# Patient Record
Sex: FEMALE | Race: BLACK OR AFRICAN AMERICAN | NOT HISPANIC OR LATINO | URBAN - METROPOLITAN AREA
[De-identification: names, ages, dates, MRNs, and addresses within clinical notes are randomized per-mention and may not be internally consistent; named-entity substitution may affect disease eponyms.]

---

## 2024-01-28 ENCOUNTER — EMERGENCY (EMERGENCY)
Facility: HOSPITAL | Age: 64
LOS: 1 days | Discharge: HOME | End: 2024-01-28
Admitting: EMERGENCY MEDICINE
Payer: COMMERCIAL

## 2024-01-28 VITALS
TEMPERATURE: 100 F | HEART RATE: 86 BPM | DIASTOLIC BLOOD PRESSURE: 78 MMHG | OXYGEN SATURATION: 99 % | RESPIRATION RATE: 20 BRPM | SYSTOLIC BLOOD PRESSURE: 157 MMHG

## 2024-01-28 VITALS
RESPIRATION RATE: 18 BRPM | SYSTOLIC BLOOD PRESSURE: 153 MMHG | OXYGEN SATURATION: 100 % | TEMPERATURE: 99 F | DIASTOLIC BLOOD PRESSURE: 74 MMHG | HEART RATE: 66 BPM

## 2024-01-28 LAB
ALBUMIN SERPL ELPH-MCNC: 3.8 G/DL — SIGNIFICANT CHANGE UP (ref 3.3–5)
ALP SERPL-CCNC: 72 U/L — SIGNIFICANT CHANGE UP (ref 40–120)
ALT FLD-CCNC: 12 U/L — SIGNIFICANT CHANGE UP (ref 4–33)
ANION GAP SERPL CALC-SCNC: 16 MMOL/L — HIGH (ref 7–14)
APPEARANCE UR: CLEAR — SIGNIFICANT CHANGE UP
AST SERPL-CCNC: 20 U/L — SIGNIFICANT CHANGE UP (ref 4–32)
BACTERIA # UR AUTO: NEGATIVE /HPF — SIGNIFICANT CHANGE UP
BASE EXCESS BLDV CALC-SCNC: 4.5 MMOL/L — HIGH (ref -2–3)
BASOPHILS # BLD AUTO: 0.02 K/UL — SIGNIFICANT CHANGE UP (ref 0–0.2)
BASOPHILS NFR BLD AUTO: 0.3 % — SIGNIFICANT CHANGE UP (ref 0–2)
BILIRUB SERPL-MCNC: 0.4 MG/DL — SIGNIFICANT CHANGE UP (ref 0.2–1.2)
BILIRUB UR-MCNC: NEGATIVE — SIGNIFICANT CHANGE UP
BLOOD GAS VENOUS COMPREHENSIVE RESULT: SIGNIFICANT CHANGE UP
BUN SERPL-MCNC: 15 MG/DL — SIGNIFICANT CHANGE UP (ref 7–23)
CALCIUM SERPL-MCNC: 9.7 MG/DL — SIGNIFICANT CHANGE UP (ref 8.4–10.5)
CAST: 1 /LPF — SIGNIFICANT CHANGE UP (ref 0–4)
CHLORIDE BLDV-SCNC: 103 MMOL/L — SIGNIFICANT CHANGE UP (ref 96–108)
CHLORIDE SERPL-SCNC: 104 MMOL/L — SIGNIFICANT CHANGE UP (ref 98–107)
CO2 BLDV-SCNC: 31.2 MMOL/L — HIGH (ref 22–26)
CO2 SERPL-SCNC: 24 MMOL/L — SIGNIFICANT CHANGE UP (ref 22–31)
COLOR SPEC: YELLOW — SIGNIFICANT CHANGE UP
CREAT SERPL-MCNC: 0.75 MG/DL — SIGNIFICANT CHANGE UP (ref 0.5–1.3)
CRP SERPL-MCNC: 8.8 MG/L — HIGH
DIFF PNL FLD: ABNORMAL
EGFR: 90 ML/MIN/1.73M2 — SIGNIFICANT CHANGE UP
EOSINOPHIL # BLD AUTO: 0.03 K/UL — SIGNIFICANT CHANGE UP (ref 0–0.5)
EOSINOPHIL NFR BLD AUTO: 0.5 % — SIGNIFICANT CHANGE UP (ref 0–6)
ERYTHROCYTE [SEDIMENTATION RATE] IN BLOOD: 75 MM/HR — HIGH (ref 4–25)
GAS PNL BLDV: 136 MMOL/L — SIGNIFICANT CHANGE UP (ref 136–145)
GLUCOSE BLDV-MCNC: 93 MG/DL — SIGNIFICANT CHANGE UP (ref 70–99)
GLUCOSE SERPL-MCNC: 93 MG/DL — SIGNIFICANT CHANGE UP (ref 70–99)
GLUCOSE UR QL: NEGATIVE MG/DL — SIGNIFICANT CHANGE UP
HCO3 BLDV-SCNC: 30 MMOL/L — HIGH (ref 22–29)
HCT VFR BLD CALC: 41.3 % — SIGNIFICANT CHANGE UP (ref 34.5–45)
HCT VFR BLDA CALC: 42 % — SIGNIFICANT CHANGE UP (ref 34.5–46.5)
HGB BLD CALC-MCNC: 13.9 G/DL — SIGNIFICANT CHANGE UP (ref 11.7–16.1)
HGB BLD-MCNC: 13.4 G/DL — SIGNIFICANT CHANGE UP (ref 11.5–15.5)
IANC: 3.99 K/UL — SIGNIFICANT CHANGE UP (ref 1.8–7.4)
IMM GRANULOCYTES NFR BLD AUTO: 0.2 % — SIGNIFICANT CHANGE UP (ref 0–0.9)
KETONES UR-MCNC: NEGATIVE MG/DL — SIGNIFICANT CHANGE UP
LACTATE BLDV-MCNC: 1.3 MMOL/L — SIGNIFICANT CHANGE UP (ref 0.5–2)
LEUKOCYTE ESTERASE UR-ACNC: NEGATIVE — SIGNIFICANT CHANGE UP
LIDOCAIN IGE QN: 31 U/L — SIGNIFICANT CHANGE UP (ref 7–60)
LYMPHOCYTES # BLD AUTO: 1.93 K/UL — SIGNIFICANT CHANGE UP (ref 1–3.3)
LYMPHOCYTES # BLD AUTO: 30.3 % — SIGNIFICANT CHANGE UP (ref 13–44)
MCHC RBC-ENTMCNC: 29.6 PG — SIGNIFICANT CHANGE UP (ref 27–34)
MCHC RBC-ENTMCNC: 32.4 GM/DL — SIGNIFICANT CHANGE UP (ref 32–36)
MCV RBC AUTO: 91.2 FL — SIGNIFICANT CHANGE UP (ref 80–100)
MONOCYTES # BLD AUTO: 0.39 K/UL — SIGNIFICANT CHANGE UP (ref 0–0.9)
MONOCYTES NFR BLD AUTO: 6.1 % — SIGNIFICANT CHANGE UP (ref 2–14)
NEUTROPHILS # BLD AUTO: 3.99 K/UL — SIGNIFICANT CHANGE UP (ref 1.8–7.4)
NEUTROPHILS NFR BLD AUTO: 62.6 % — SIGNIFICANT CHANGE UP (ref 43–77)
NITRITE UR-MCNC: NEGATIVE — SIGNIFICANT CHANGE UP
NRBC # BLD: 0 /100 WBCS — SIGNIFICANT CHANGE UP (ref 0–0)
NRBC # FLD: 0 K/UL — SIGNIFICANT CHANGE UP (ref 0–0)
PCO2 BLDV: 46 MMHG — SIGNIFICANT CHANGE UP (ref 39–52)
PH BLDV: 7.42 — SIGNIFICANT CHANGE UP (ref 7.32–7.43)
PH UR: 5.5 — SIGNIFICANT CHANGE UP (ref 5–8)
PLATELET # BLD AUTO: 244 K/UL — SIGNIFICANT CHANGE UP (ref 150–400)
PO2 BLDV: 50 MMHG — HIGH (ref 25–45)
POTASSIUM BLDV-SCNC: 3.7 MMOL/L — SIGNIFICANT CHANGE UP (ref 3.5–5.1)
POTASSIUM SERPL-MCNC: 3.9 MMOL/L — SIGNIFICANT CHANGE UP (ref 3.5–5.3)
POTASSIUM SERPL-SCNC: 3.9 MMOL/L — SIGNIFICANT CHANGE UP (ref 3.5–5.3)
PROT SERPL-MCNC: 8.8 G/DL — HIGH (ref 6–8.3)
PROT UR-MCNC: SIGNIFICANT CHANGE UP MG/DL
RBC # BLD: 4.53 M/UL — SIGNIFICANT CHANGE UP (ref 3.8–5.2)
RBC # FLD: 11.6 % — SIGNIFICANT CHANGE UP (ref 10.3–14.5)
RBC CASTS # UR COMP ASSIST: 9 /HPF — HIGH (ref 0–4)
SAO2 % BLDV: 81.7 % — SIGNIFICANT CHANGE UP (ref 67–88)
SODIUM SERPL-SCNC: 144 MMOL/L — SIGNIFICANT CHANGE UP (ref 135–145)
SP GR SPEC: 1.03 — HIGH (ref 1–1.03)
SQUAMOUS # UR AUTO: 5 /HPF — SIGNIFICANT CHANGE UP (ref 0–5)
UROBILINOGEN FLD QL: 1 MG/DL — SIGNIFICANT CHANGE UP (ref 0.2–1)
WBC # BLD: 6.37 K/UL — SIGNIFICANT CHANGE UP (ref 3.8–10.5)
WBC # FLD AUTO: 6.37 K/UL — SIGNIFICANT CHANGE UP (ref 3.8–10.5)
WBC UR QL: 3 /HPF — SIGNIFICANT CHANGE UP (ref 0–5)

## 2024-01-28 PROCEDURE — 74177 CT ABD & PELVIS W/CONTRAST: CPT | Mod: 26,MA

## 2024-01-28 PROCEDURE — 99285 EMERGENCY DEPT VISIT HI MDM: CPT

## 2024-01-28 RX ORDER — SODIUM CHLORIDE 9 MG/ML
1000 INJECTION INTRAMUSCULAR; INTRAVENOUS; SUBCUTANEOUS ONCE
Refills: 0 | Status: COMPLETED | OUTPATIENT
Start: 2024-01-28 | End: 2024-01-28

## 2024-01-28 RX ORDER — ACETAMINOPHEN 500 MG
1000 TABLET ORAL ONCE
Refills: 0 | Status: COMPLETED | OUTPATIENT
Start: 2024-01-28 | End: 2024-01-28

## 2024-01-28 RX ORDER — KETOROLAC TROMETHAMINE 30 MG/ML
15 SYRINGE (ML) INJECTION ONCE
Refills: 0 | Status: DISCONTINUED | OUTPATIENT
Start: 2024-01-28 | End: 2024-01-28

## 2024-01-28 RX ADMIN — SODIUM CHLORIDE 1000 MILLILITER(S): 9 INJECTION INTRAMUSCULAR; INTRAVENOUS; SUBCUTANEOUS at 18:34

## 2024-01-28 RX ADMIN — Medication 400 MILLIGRAM(S): at 18:35

## 2024-01-28 RX ADMIN — Medication 15 MILLIGRAM(S): at 22:10

## 2024-01-28 NOTE — ED PROVIDER NOTE - NSPTACCESSSVCSAPPTDETAILS_ED_ALL_ED_FT
several weeks of multiple joint pains including bilateral shoulders, lower back, bilateral knees, bilateral hands for which she has seen her primary care doctor and was told it is likely rheumatologic, she has not establish care with rheumatology as of yet please help schedule an appointment within 2 weeks.     If possible also help schedule an appointment with general surgery within 3-4 weeks for cyst vs fat on pancreas on CT scan. Thank you!

## 2024-01-28 NOTE — ED PROVIDER NOTE - OBJECTIVE STATEMENT
63-year-old female with HTN presenting with several weeks of multiple joint pains including bilateral shoulders, lower back, bilateral knees, bilateral hands for which she has seen her primary care doctor and has been referred to rheumatology; in addition to intermittent right lower quadrant pain/discomfort for the past 3 days after discontinuing her prescribed meloxicam.    Patient states she has an upcoming scheduled appointment with rheumatology in April however is having persistent joint discomfort therefore came to the emergency room today.  In addition patient states she worked up potential side effects of meloxicam such as heart attack/stroke therefore discontinued this medication 3 days ago and almost immediately developed pain in her right lower quadrant which has consistently been present with no change in severity or location.  Patient states she has not taken any pain medications since the past 3 days.  Patient states that the discomfort in her joints improves after getting up and ambulating for some time. has been taking her hydrochlorothiazide.    No prior Ap surgeries.    Denies fevers, chills, rigors, red joint, swollen joints, inability to move extremities or joints anywhere on the body, paresthesias, weakness, change in color or temperature to extremities, inability to ambulate, change in gait, fall, injury or trauma, cough, congestion, chest pain, shortness of breath, palpitations, vomiting, diarrhea, constipation, melena, hematochezia, dysuria, hematuria, flank pain, pelvic pain, genital lesions, leg swelling, calf swelling or pain, prolonged immobilization or recent travel, personal or family history of VTE, rash,, lightheadedness, dizziness. 63-year-old female with HTN presenting with several weeks of multiple joint pains including bilateral shoulders, lower back, bilateral knees, bilateral hands for which she has seen her primary care doctor and has been referred to rheumatology; in addition to intermittent right lower quadrant pain/discomfort for the past 3 days after discontinuing her prescribed meloxicam.    Patient states she has an upcoming scheduled appointment with rheumatology in April however is having persistent joint discomfort therefore came to the emergency room today.  In addition patient states she worked up potential side effects of meloxicam such as heart attack/stroke therefore discontinued this medication 3 days ago and almost immediately developed pain in her right lower quadrant which has consistently been present with no change in severity or location.  Patient states she has not taken any pain medications since the past 3 days.  Patient states that the discomfort in her joints improves after getting up and ambulating for some time. has been taking her hydrochlorothiazide. Pt also states her PMD discussed possibility of "injections for pain," and is also already refereed to physical The[facility] which she has been attending with some improvement.  Pain is not worsened since she initially started with this pain or changed. She also had outpatient EMG exam and dx with carpal tunnel to her b/l wrists.       No prior Ap surgeries.    Denies fevers, chills, rigors, red joint, swollen joints, inability to move extremities or joints anywhere on the body, paresthesias, weakness, change in color or temperature to extremities, inability to ambulate, change in gait, fall, injury or trauma, cough, congestion, chest pain, shortness of breath, palpitations, vomiting, diarrhea, constipation, melena, hematochezia, dysuria, hematuria, flank pain, pelvic pain, genital lesions, leg swelling, calf swelling or pain, prolonged immobilization or recent travel, personal or family history of VTE, rash,, lightheadedness, dizziness.

## 2024-01-28 NOTE — ED PROVIDER NOTE - PROGRESS NOTE DETAILS
MANFRED Herrera: Patient states pain temporarily subsided with the use of dose of acetaminophen however has reoccurred.  Discussed labs elevated for ESR CRP normal which may be a reflection of her primary care physician's concern of rheumatological condition.  Patient aware still pending CT abdomen pelvis given the right lower quadrant tenderness.  Patient ordered for Toradol 15 IV for pain interm. MANFRED Herrera: Discussed with patient that CT is without emergent findings.  Discussed CT is showing uterine fibroids, emesis versus fat to the pancreatic body which is typically benign we will provide general surgery referral for follow-up.   Patient already has appointment with rheumatology in April however given daily discomfort will attempt to provide this time around referral for rheumatology for more urgent appointment.  Discussed strict return precautions and prompt follow-up.  Patient verbalized understanding and agrees with the plan.  IV removed.

## 2024-01-28 NOTE — ED ADULT NURSE NOTE - OBJECTIVE STATEMENT
Pt received tointake 10c   , awake and alert, A&OX4, ambulatory. C/o. joint pain and right sided abdominal pain. pt abdomen soft , non-tender and non-distended. pt states this has been going on for a few weeks now. pt denies any N/V.      Respirations even and unlabored. Denies CP, SOB, N/V, HA, dizziness, palpitations, blurry vision. 20G IV placed to left AC     . Bed in lowest position, call bell within reach. Safety maintained.

## 2024-01-28 NOTE — ED ADULT NURSE NOTE - NSFALLUNIVINTERV_ED_ALL_ED
Bed/Stretcher in lowest position, wheels locked, appropriate side rails in place/Call bell, personal items and telephone in reach/Instruct patient to call for assistance before getting out of bed/chair/stretcher/Non-slip footwear applied when patient is off stretcher/[place] to call system/Physically safe environment - no spills, clutter or unnecessary equipment/Purposeful proactive rounding/Room/bathroom lighting operational, light cord in reach

## 2024-01-28 NOTE — ED PROVIDER NOTE - PATIENT PORTAL LINK FT
You can access the FollowMyHealth Patient Portal offered by [facility] by registering at the following website: [email or web address]. By joining [facility]’s FollowMyHealth portal, you will also be able to view your health information using other applications (apps) compatible with our system.

## 2024-01-28 NOTE — ED ADULT NURSE NOTE - CHIEF COMPLAINT QUOTE
pt coming with 3 wks of joints pain with raul. hand/finger swelling, pt stated also had RLQ pain x 4 days,

## 2024-01-28 NOTE — ED PROVIDER NOTE - CLINICAL SUMMARY MEDICAL DECISION MAKING FREE TEXT BOX
63-year-old female with HTN presenting with several weeks of multiple joint pains including bilateral shoulders, lower back, bilateral knees, bilateral hands for which she has seen her primary care doctor and has been referred to rheumatology; in addition to intermittent right lower quadrant pain/discomfort for the past 3 days after discontinuing her prescribed meloxicam.    In traige pt with documentation of T 100 F but no tachycardia and on exam pt is not warm to touch, she also denies fevers; rpt tmp done by NICOLE Bryant and found oral temp to be 98F.     # joint pain    # RLQ Pain    Abdominal exam without peritoneal signs. No evidence of acute abdomen at this time. Well appearing. Low suspicion for acute hepatobiliary disease (including acute cholecystitis), acute pancreatitis, PUD (including perforation), acute infectious processes (pneumonia, hepatitis, pyelonephritis), acute appendicitis, vascular catastrophe, bowel obstruction or viscus perforation. Presentation not consistent with other acute, emergent causes of abdominal pain at this time.    Plan: labs, UA, CT AP, pain control 63-year-old female with HTN presenting with several weeks of multiple joint pains including bilateral shoulders, lower back, bilateral knees, bilateral hands for which she has seen her primary care doctor and has been referred to rheumatology; in addition to intermittent right lower quadrant pain/discomfort for the past 3 days after discontinuing her prescribed meloxicam.    In traige pt with documentation of T 100 F but no tachycardia and on exam pt is not warm to touch, she also denies fevers; rpt tmp done by NICOLE Bryant and found oral temp to be 98F.     # joint pain    Able to flex and extend all joints in Upper extremities and lower extremity bilaterally without limitation by pain. Considered, but doubt, fracture, septic arthritis, other acute unstable fracture, or significant neurovascular compromise.    XR unlikely to show emergent findings as no trauma and multiple joints. Will obtain ESR/CRP as pt has appoint with rheumatology. Appears pain improves with movement throughout the day likely arthritis vs nonemergent rheumatologic /autoimmune underlying condition. Pt also states her PMD discussed possibility of "injections for pain," and is also already refereed to physical Thear[facility] which she has been attending with some improvement.  Pain is not worsened since she initially started with this pain or changed. She also had outpatient EMG exam and dx with carpal tunnel to her b/l wrists.     # RLQ Pain    Abdominal exam without peritoneal signs. No evidence of acute abdomen at this time. Well appearing. Low suspicion for acute hepatobiliary disease (including acute cholecystitis), acute pancreatitis, PUD (including perforation), acute infectious processes (pneumonia, hepatitis, pyelonephritis), acute appendicitis, vascular catastrophe, bowel obstruction or viscus perforation. Presentation not consistent with other acute, emergent causes of abdominal pain at this time.    Plan: labs, UA, CT AP, pain control

## 2024-01-28 NOTE — ED ADULT TRIAGE NOTE - CHIEF COMPLAINT QUOTE
non affiliated
pt coming with 3 wks of joints pain with raul. hand/finger swelling, pt stated also had RLQ pain x 4 days,

## 2024-01-28 NOTE — ED PROVIDER NOTE - NSFOLLOWUPINSTRUCTIONS_ED_ALL_ED_FT
You were seen in the emergency room for right lower quadrant abdominal pain, and joint pain.  The labs done in the emergency room are nonemergent however your ESR and CRP were mildly elevated which may indicate a rheumatologic disorder as suggested by your primary care doctor.  We have provided you with a referral please follow-up as discussed.  You may continue taking meloxicam as discussed with your primary care doctor.  In addition to this on CT we found a small cyst versus fat stranding on your pancreas for which we have provided you with a referral for general surgery, these findings are typically benign and nonemergent and tend to resolve on their own however it is important for you to have the appropriate follow-up.  Please return if any new, worsening, or concerning symptoms develop.  All of your test results are attached to your discharge papers below.

## 2024-01-28 NOTE — ED PROVIDER NOTE - CARE PLAN
Principal Discharge DX:	Right lower quadrant abdominal pain  Secondary Diagnosis:	Generalized joint pain   1

## 2024-01-29 LAB
CULTURE RESULTS: SIGNIFICANT CHANGE UP
SPECIMEN SOURCE: SIGNIFICANT CHANGE UP

## 2024-02-26 PROBLEM — Z00.00 ENCOUNTER FOR PREVENTIVE HEALTH EXAMINATION: Status: ACTIVE | Noted: 2024-02-26

## 2024-05-17 ENCOUNTER — APPOINTMENT (OUTPATIENT)
Dept: RHEUMATOLOGY | Facility: CLINIC | Age: 64
End: 2024-05-17